# Patient Record
Sex: MALE | Race: WHITE | NOT HISPANIC OR LATINO | Employment: OTHER | URBAN - METROPOLITAN AREA
[De-identification: names, ages, dates, MRNs, and addresses within clinical notes are randomized per-mention and may not be internally consistent; named-entity substitution may affect disease eponyms.]

---

## 2017-11-03 ENCOUNTER — OFFICE VISIT (OUTPATIENT)
Dept: NEUROSURGERY | Facility: CLINIC | Age: 82
End: 2017-11-03

## 2017-11-03 ENCOUNTER — HOSPITAL ENCOUNTER (OUTPATIENT)
Dept: CT IMAGING | Facility: HOSPITAL | Age: 82
Discharge: HOME | End: 2017-11-03
Admitting: PHYSICIAN ASSISTANT

## 2017-11-03 VITALS
HEIGHT: 71 IN | OXYGEN SATURATION: 98 % | BODY MASS INDEX: 24.36 KG/M2 | WEIGHT: 174 LBS | TEMPERATURE: 98.7 F | DIASTOLIC BLOOD PRESSURE: 68 MMHG | SYSTOLIC BLOOD PRESSURE: 154 MMHG

## 2017-11-03 DIAGNOSIS — G45.4 TRANSIENT GLOBAL AMNESIA: ICD-10-CM

## 2017-11-03 DIAGNOSIS — I65.21 STENOSIS OF RIGHT CAROTID ARTERY: ICD-10-CM

## 2017-11-03 DIAGNOSIS — Q21.12 PFO (PATENT FORAMEN OVALE): Primary | ICD-10-CM

## 2017-11-03 DIAGNOSIS — R06.09 DYSPNEA ON EXERTION: ICD-10-CM

## 2017-11-03 PROBLEM — G45.9 TIA (TRANSIENT ISCHEMIC ATTACK): Status: ACTIVE | Noted: 2017-11-03

## 2017-11-03 PROBLEM — I10 HYPERTENSION: Status: ACTIVE | Noted: 2017-11-03

## 2017-11-03 PROBLEM — I51.9 HEART DISEASE: Status: ACTIVE | Noted: 2017-11-03

## 2017-11-03 PROCEDURE — 0 IOPAMIDOL PER 1 ML: Performed by: PHYSICIAN ASSISTANT

## 2017-11-03 PROCEDURE — 82565 ASSAY OF CREATININE: CPT

## 2017-11-03 PROCEDURE — 99024 POSTOP FOLLOW-UP VISIT: CPT | Performed by: PHYSICIAN ASSISTANT

## 2017-11-03 PROCEDURE — 70498 CT ANGIOGRAPHY NECK: CPT

## 2017-11-03 RX ORDER — ALPRAZOLAM 0.25 MG/1
0.25 TABLET ORAL 2 TIMES DAILY PRN
COMMUNITY
End: 2019-09-12

## 2017-11-03 RX ORDER — PRAVASTATIN SODIUM 20 MG
20 TABLET ORAL DAILY
COMMUNITY

## 2017-11-03 RX ORDER — CLOPIDOGREL BISULFATE 75 MG/1
75 TABLET ORAL DAILY
COMMUNITY

## 2017-11-03 RX ORDER — METHOCARBAMOL 500 MG/1
500 TABLET, FILM COATED ORAL 4 TIMES DAILY
COMMUNITY
End: 2018-06-13 | Stop reason: HOSPADM

## 2017-11-03 RX ORDER — TAMSULOSIN HYDROCHLORIDE 0.4 MG/1
1 CAPSULE ORAL NIGHTLY
COMMUNITY
End: 2020-07-16 | Stop reason: ALTCHOICE

## 2017-11-03 RX ORDER — AMLODIPINE BESYLATE 10 MG/1
10 TABLET ORAL DAILY
COMMUNITY
End: 2018-05-07

## 2017-11-03 RX ORDER — NITROGLYCERIN 400 UG/1
1 SPRAY ORAL AS NEEDED
COMMUNITY

## 2017-11-03 RX ORDER — CARVEDILOL 6.25 MG/1
6.25 TABLET ORAL 2 TIMES DAILY WITH MEALS
COMMUNITY

## 2017-11-03 RX ORDER — TRAMADOL HYDROCHLORIDE 50 MG/1
50 TABLET ORAL EVERY 6 HOURS PRN
COMMUNITY
End: 2019-09-12

## 2017-11-03 RX ORDER — ASPIRIN 81 MG/1
81 TABLET ORAL DAILY
COMMUNITY

## 2017-11-03 RX ADMIN — IOPAMIDOL 50 ML: 755 INJECTION, SOLUTION INTRAVENOUS at 17:25

## 2017-11-06 ENCOUNTER — OFFICE VISIT (OUTPATIENT)
Dept: NEUROSURGERY | Facility: CLINIC | Age: 82
End: 2017-11-06

## 2017-11-06 VITALS
TEMPERATURE: 97 F | SYSTOLIC BLOOD PRESSURE: 144 MMHG | DIASTOLIC BLOOD PRESSURE: 58 MMHG | BODY MASS INDEX: 24.22 KG/M2 | HEIGHT: 71 IN | WEIGHT: 173 LBS

## 2017-11-06 DIAGNOSIS — I65.21 STENOSIS OF RIGHT CAROTID ARTERY: Primary | ICD-10-CM

## 2017-11-06 LAB — CREAT BLDA-MCNC: 1.7 MG/DL (ref 0.6–1.3)

## 2017-11-06 PROCEDURE — 99203 OFFICE O/P NEW LOW 30 MIN: CPT | Performed by: NEUROLOGICAL SURGERY

## 2017-11-06 NOTE — PROGRESS NOTES
NAME: HERMAN ZAVALA   DOS: 2017  : 1933  PCP: REY Alvarez    Chief Complaint:   A is an 84-year-old with an asymptomatic right carotid bruit  Chief Complaint   Patient presents with   • Carotid Artery Disease       History of Present Illness:  84 y.o. male      he presents for evaluation with a CTA of the neck and head he denies amaurosis weakness or other issues he is on aspirin Plavix and cholesterol medicines.  symptomatically carotid  PMHX  Allergies:  Allergies   Allergen Reactions   • Penicillins      Medications    Current Outpatient Prescriptions:   •  ALPRAZolam (XANAX) 0.25 MG tablet, Take 0.25 mg by mouth 2 (Two) Times a Day As Needed for Anxiety., Disp: , Rfl:   •  amLODIPine (NORVASC) 10 MG tablet, Take 10 mg by mouth Daily., Disp: , Rfl:   •  aspirin (ECOTRIN LOW STRENGTH) 81 MG EC tablet, Take 81 mg by mouth Daily., Disp: , Rfl:   •  carvedilol (COREG) 6.25 MG tablet, Take 6.25 mg by mouth 2 (Two) Times a Day With Meals., Disp: , Rfl:   •  ClonazePAM (KLONOPIN PO), Take 10 mg by mouth., Disp: , Rfl:   •  clopidogrel (PLAVIX) 75 MG tablet, Take 75 mg by mouth Daily., Disp: , Rfl:   •  COLCHICINE-PROBENECID PO, Take  by mouth., Disp: , Rfl:   •  linaclotide (LINZESS) 145 MCG capsule capsule, Take 145 mcg by mouth Every Morning Before Breakfast., Disp: , Rfl:   •  methocarbamol (ROBAXIN) 500 MG tablet, Take 500 mg by mouth 4 (Four) Times a Day., Disp: , Rfl:   •  nitroglycerin (NITROLINGUAL) 0.4 MG/SPRAY spray, Place 1 spray under the tongue As Needed for Chest Pain., Disp: , Rfl:   •  pravastatin (PRAVACHOL) 20 MG tablet, Take 20 mg by mouth Daily., Disp: , Rfl:   •  tamsulosin (FLOMAX) 0.4 MG capsule 24 hr capsule, Take 1 capsule by mouth Every Night., Disp: , Rfl:   •  traMADol (ULTRAM) 50 MG tablet, Take 50 mg by mouth Every 6 (Six) Hours As Needed for Moderate Pain ., Disp: , Rfl:   Past Medical History:  Past Medical History:   Diagnosis Date   • Heart disease    •  Hypertension    • Myocardial infarction 2003     Past Surgical History:  Past Surgical History:   Procedure Laterality Date   • CARDIAC SURGERY  2003    Open heart surgery, x4 bypasses   • EYE SURGERY  10/22/09 & 09/23/14    x2   • HERNIA REPAIR  1962    x2   • PROSTATE SURGERY  1980     Social Hx:  Social History   Substance Use Topics   • Smoking status: Never Smoker   • Smokeless tobacco: None   • Alcohol use No     Family Hx:  Family History   Problem Relation Age of Onset   • Arthritis Mother    • Arthritis Father      Review of Systems:        Review of Systems   Constitutional: Positive for fatigue. Negative for activity change, appetite change, chills, diaphoresis, fever and unexpected weight change.   HENT: Negative for congestion, dental problem, drooling, ear discharge, ear pain, facial swelling, hearing loss, mouth sores, nosebleeds, postnasal drip, rhinorrhea, sinus pressure, sneezing, sore throat, tinnitus, trouble swallowing and voice change.    Eyes: Negative for photophobia, pain, discharge, redness, itching and visual disturbance.   Respiratory: Negative for apnea, cough, choking, chest tightness, shortness of breath, wheezing and stridor.    Cardiovascular: Negative for chest pain, palpitations and leg swelling.   Gastrointestinal: Negative for abdominal distention, abdominal pain, anal bleeding, blood in stool, constipation, diarrhea, nausea, rectal pain and vomiting.   Endocrine: Negative for cold intolerance, heat intolerance, polydipsia, polyphagia and polyuria.   Genitourinary: Negative for decreased urine volume, difficulty urinating, dysuria, enuresis, flank pain, frequency, genital sores, hematuria and urgency.   Musculoskeletal: Negative for arthralgias, back pain, gait problem, joint swelling, myalgias, neck pain and neck stiffness.   Skin: Negative for color change, pallor, rash and wound.   Allergic/Immunologic: Negative for environmental allergies, food allergies and immunocompromised  state.   Neurological: Negative for dizziness, tremors, seizures, syncope, facial asymmetry, speech difficulty, weakness, light-headedness, numbness and headaches.   Hematological: Negative for adenopathy. Does not bruise/bleed easily.   Psychiatric/Behavioral: Negative for agitation, behavioral problems, confusion, decreased concentration, dysphoric mood, hallucinations, self-injury, sleep disturbance and suicidal ideas. The patient is not nervous/anxious and is not hyperactive.    All other systems reviewed and are negative.  I have reviewed this note template and all pertinent parts of the review of systems social, family history, surgical history and medication list        Physical Examination:  Vitals:    11/06/17 1607   BP: 144/58   Temp:       General Appearance:   Well developed, well nourished, well groomed, alert, and cooperative.  Neurological examination:  Neurologic Exam  Vital signs were reviewed and documented in the chart  Patient appeared in good neurologic function with normal comprehension fluent speech  Mood and affect are normal  Sense of smell deferred    Pupils symmetric equally reactive funduscopic exam not visualized   Visual fields intact to confrontation  Extraocular movements intact  Face motor function is symmetric  Facial sensations normal  Hearing intact to finger rub hearing intact to finger rub  Tongue is midline  Palate symmetric  Swallowing normal  Shoulder shrug normal    Muscle bulk and tone normal  5 out of 5 strength no motor drift  Gait normal intact  Negative Romberg  No clonus long tract signs or myelopathy    Reflexes symmetric  Mild edema noted and extremities skin appears normal    Straight leg raise sign absent  No signs of intrinsic hip dysfunction  Back is without any lesions or abnormality  Feet are warm and well perfused        Review of Imaging/DATA:  Reviewed a CTA of the neck and head demonstrates a high-grade likely stenosis of the right internal carotid with  diffuse calcification redundant carotid is present  Diagnoses/Plan:    Mr. Gallegos is a 84 y.o. male   I had an extensive discussion with him I offered him carotid stenting and/or endarterectomy  and I believe these are equivalent therapy.  He would be a candidate for R carotid intervention trialed and I explained that to them.    That being said I also explained to him that he may be a candidate and explained the logic of the crest to trial.  It is a medical versus surgical treatment trial and explained to him that best medical management would consist of aspirin Plavix and statin therapy.  He is clearly asymptomatic and after extensive discussions he is recommended with his advanced age and the risk of the procedures to monitor this clinically.  I think that's a very good decision and 1 that is very reasonable given the absence of symptomatology.  I urged him to call me and follow-up sooner if there is any issues I will see him back in 6 months with a carotid ultrasound.

## 2018-05-07 ENCOUNTER — OFFICE VISIT (OUTPATIENT)
Dept: NEUROSURGERY | Facility: CLINIC | Age: 83
End: 2018-05-07

## 2018-05-07 ENCOUNTER — HOSPITAL ENCOUNTER (OUTPATIENT)
Dept: CARDIOLOGY | Facility: HOSPITAL | Age: 83
Discharge: HOME | End: 2018-05-07
Attending: NEUROLOGICAL SURGERY | Admitting: NEUROLOGICAL SURGERY

## 2018-05-07 VITALS
BODY MASS INDEX: 23.1 KG/M2 | RESPIRATION RATE: 18 BRPM | DIASTOLIC BLOOD PRESSURE: 60 MMHG | OXYGEN SATURATION: 99 % | HEIGHT: 71 IN | SYSTOLIC BLOOD PRESSURE: 118 MMHG | WEIGHT: 165 LBS

## 2018-05-07 DIAGNOSIS — I65.21 STENOSIS OF RIGHT CAROTID ARTERY: ICD-10-CM

## 2018-05-07 DIAGNOSIS — R09.89 RIGHT CAROTID BRUIT: Primary | ICD-10-CM

## 2018-05-07 LAB
BH CV ECHO MEAS - BSA(HAYCOCK): 2 M^2
BH CV ECHO MEAS - BSA: 2 M^2
BH CV ECHO MEAS - BZI_BMI: 24.1 KILOGRAMS/M^2
BH CV ECHO MEAS - BZI_METRIC_HEIGHT: 180.3 CM
BH CV ECHO MEAS - BZI_METRIC_WEIGHT: 78.5 KG
BH CV XLRA MEAS LEFT CCA RATIO VEL: 104 CM/SEC
BH CV XLRA MEAS LEFT DIST CCA EDV: 16.5 CM/SEC
BH CV XLRA MEAS LEFT DIST CCA PSV: 91.1 CM/SEC
BH CV XLRA MEAS LEFT DIST ICA EDV: 19.6 CM/SEC
BH CV XLRA MEAS LEFT DIST ICA PSV: 88 CM/SEC
BH CV XLRA MEAS LEFT ICA RATIO VEL: 101 CM/SEC
BH CV XLRA MEAS LEFT ICA/CCA RATIO: 0.97
BH CV XLRA MEAS LEFT MID CCA EDV: 12.6 CM/SEC
BH CV XLRA MEAS LEFT MID CCA PSV: 104.5 CM/SEC
BH CV XLRA MEAS LEFT MID ICA EDV: 16.5 CM/SEC
BH CV XLRA MEAS LEFT MID ICA PSV: 72.3 CM/SEC
BH CV XLRA MEAS LEFT PROX CCA EDV: 10.2 CM/SEC
BH CV XLRA MEAS LEFT PROX CCA PSV: 135.1 CM/SEC
BH CV XLRA MEAS LEFT PROX ECA PSV: 92.7 CM/SEC
BH CV XLRA MEAS LEFT PROX ICA EDV: 18.9 CM/SEC
BH CV XLRA MEAS LEFT PROX ICA PSV: 102.1 CM/SEC
BH CV XLRA MEAS LEFT PROX SCLA EDV: 113 CM/SEC
BH CV XLRA MEAS LEFT PROX SCLA PSV: 129.5 CM/SEC
BH CV XLRA MEAS LEFT VERTEBRAL A PSV: 46.8 CM/SEC
BH CV XLRA MEAS RIGHT CCA RATIO VEL: 68.4 CM/SEC
BH CV XLRA MEAS RIGHT DIST CCA EDV: 9.4 CM/SEC
BH CV XLRA MEAS RIGHT DIST CCA PSV: 57.9 CM/SEC
BH CV XLRA MEAS RIGHT DIST ICA EDV: 11.4 CM/SEC
BH CV XLRA MEAS RIGHT DIST ICA PSV: 48 CM/SEC
BH CV XLRA MEAS RIGHT ICA RATIO VEL: 336 CM/SEC
BH CV XLRA MEAS RIGHT ICA/CCA RATIO: 4.9
BH CV XLRA MEAS RIGHT MID CCA EDV: 9.9 CM/SEC
BH CV XLRA MEAS RIGHT MID CCA PSV: 68.9 CM/SEC
BH CV XLRA MEAS RIGHT MID ICA EDV: 48 CM/SEC
BH CV XLRA MEAS RIGHT MID ICA PSV: 336 CM/SEC
BH CV XLRA MEAS RIGHT PROX CCA EDV: 12.1 CM/SEC
BH CV XLRA MEAS RIGHT PROX CCA PSV: 63.4 CM/SEC
BH CV XLRA MEAS RIGHT PROX ECA PSV: 127.1 CM/SEC
BH CV XLRA MEAS RIGHT PROX ICA EDV: 11.9 CM/SEC
BH CV XLRA MEAS RIGHT PROX ICA PSV: 69.8 CM/SEC
BH CV XLRA MEAS RIGHT PROX SCLA PSV: 62.9 CM/SEC
BH CV XLRA MEAS RIGHT VERTEBRAL A PSV: 54.6 CM/SEC
LEFT ARM BP: NORMAL MMHG
RIGHT ARM BP: NORMAL MMHG

## 2018-05-07 PROCEDURE — 93880 EXTRACRANIAL BILAT STUDY: CPT

## 2018-05-07 PROCEDURE — 93880 EXTRACRANIAL BILAT STUDY: CPT | Performed by: INTERNAL MEDICINE

## 2018-05-07 PROCEDURE — 99213 OFFICE O/P EST LOW 20 MIN: CPT | Performed by: NEUROLOGICAL SURGERY

## 2018-05-07 RX ORDER — SODIUM CHLORIDE 0.65 %
AEROSOL, SPRAY (ML) NASAL
Refills: 0 | COMMUNITY
Start: 2018-04-11

## 2018-05-07 RX ORDER — NITROGLYCERIN 0.4 MG/1
TABLET SUBLINGUAL
Refills: 0 | COMMUNITY
Start: 2018-04-04

## 2018-05-07 RX ORDER — CYCLOBENZAPRINE HCL 5 MG
TABLET ORAL
Refills: 0 | COMMUNITY
Start: 2018-04-16 | End: 2019-09-12

## 2018-05-07 RX ORDER — CIPROFLOXACIN 500 MG/1
TABLET, FILM COATED ORAL
COMMUNITY
Start: 2018-04-11 | End: 2018-06-13 | Stop reason: HOSPADM

## 2018-05-07 RX ORDER — PREDNISONE 20 MG/1
20 TABLET ORAL 2 TIMES DAILY
Refills: 0 | COMMUNITY
Start: 2018-03-02 | End: 2018-06-13 | Stop reason: HOSPADM

## 2018-05-07 RX ORDER — MECLIZINE HYDROCHLORIDE 25 MG/1
TABLET ORAL
Refills: 0 | COMMUNITY
Start: 2018-03-02

## 2018-05-07 RX ORDER — GABAPENTIN 100 MG/1
100 CAPSULE ORAL
Refills: 0 | COMMUNITY
Start: 2018-03-26 | End: 2018-06-13 | Stop reason: HOSPADM

## 2018-05-07 RX ORDER — PROBENECID AND COLCHICINE 500; .5 MG/1; MG/1
TABLET ORAL
Refills: 0 | COMMUNITY
Start: 2018-02-23 | End: 2018-06-13 | Stop reason: HOSPADM

## 2018-05-07 RX ORDER — AMLODIPINE BESYLATE 5 MG/1
2.5 TABLET ORAL DAILY
Refills: 0 | COMMUNITY
Start: 2018-05-02 | End: 2019-09-12

## 2018-05-07 RX ORDER — MECLIZINE HCL 12.5 MG/1
12.5 TABLET ORAL EVERY 8 HOURS PRN
Refills: 0 | COMMUNITY
Start: 2018-03-01 | End: 2018-05-07

## 2018-05-07 NOTE — PROGRESS NOTES
NAME: HERMAN ZAVALA   DOS: 2018  : 1933  PCP: Jean Paul Waldron MD    Chief Complaint:  Right-sided carotid stenosis, asymptomatic  Chief Complaint   Patient presents with   • R-carotid Stenosis     6 month duplex       History of Present Illness:  84 y.o. male   This 84-year-old gentleman who has a diagnosis of a symptomatic high-grade right-sided carotid stenosis at which time I talked him last visit about endarterectomy versus stenting versus management in the crest trial.  He is elected to defer treatment.  He denies symptoms of amaurosis in the right eye he's currently on aspirin Plavix and Pravachol and is here for follow-up doing well    PMHX  Allergies:  Allergies   Allergen Reactions   • Penicillins      Medications    Current Outpatient Prescriptions:   •  ALPRAZolam (XANAX) 0.25 MG tablet, Take 0.25 mg by mouth 2 (Two) Times a Day As Needed for Anxiety., Disp: , Rfl:   •  amLODIPine (NORVASC) 5 MG tablet, Take 2.5 mg by mouth Daily., Disp: , Rfl: 0  •  aspirin (ECOTRIN LOW STRENGTH) 81 MG EC tablet, Take 81 mg by mouth Daily., Disp: , Rfl:   •  carvedilol (COREG) 6.25 MG tablet, Take 6.25 mg by mouth 2 (Two) Times a Day With Meals., Disp: , Rfl:   •  ciprofloxacin (CIPRO) 500 MG tablet, , Disp: , Rfl:   •  ClonazePAM (KLONOPIN PO), Take 10 mg by mouth., Disp: , Rfl:   •  clopidogrel (PLAVIX) 75 MG tablet, Take 75 mg by mouth Daily., Disp: , Rfl:   •  colchicine-probenecid (COL-BENEMID) 0.5-500 MG tablet, , Disp: , Rfl: 0  •  COLCHICINE-PROBENECID PO, Take  by mouth., Disp: , Rfl:   •  cyclobenzaprine (FLEXERIL) 5 MG tablet, take 1/2-1 tablet by mouth at bedtime if needed for NECK MUSCLE PAIN, Disp: , Rfl: 0  •  gabapentin (NEURONTIN) 100 MG capsule, Take 100 mg by mouth every night at bedtime., Disp: , Rfl: 0  •  hydrocortisone 2.5 % cream, APPLY AS DIRECTED UP TO 3 TIMES A DAY, Disp: , Rfl: 0  •  linaclotide (LINZESS) 145 MCG capsule capsule, Take 145 mcg by mouth Every Morning  Before Breakfast., Disp: , Rfl:   •  meclizine (ANTIVERT) 25 MG tablet, take 1 tablet by mouth every 6 hours if needed for VERTIGO, Disp: , Rfl: 0  •  methocarbamol (ROBAXIN) 500 MG tablet, Take 500 mg by mouth 4 (Four) Times a Day., Disp: , Rfl:   •  nitroglycerin (NITROLINGUAL) 0.4 MG/SPRAY spray, Place 1 spray under the tongue As Needed for Chest Pain., Disp: , Rfl:   •  nitroglycerin (NITROSTAT) 0.4 MG SL tablet, PLACE 1 TABLET UNDER THE TONGUE AND ALLOW TO DISSOLVE 1 TIME, Disp: , Rfl: 0  •  pravastatin (PRAVACHOL) 20 MG tablet, Take 20 mg by mouth Daily., Disp: , Rfl:   •  predniSONE (DELTASONE) 20 MG tablet, Take 20 mg by mouth 2 (Two) Times a Day., Disp: , Rfl: 0  •  RA SALINE NASAL SPRAY 0.65 % nasal spray, USE 2 SPRAYS IN EACH NOSTRIL EVERY 4 HOURS IF NEEDED, Disp: , Rfl: 0  •  tamsulosin (FLOMAX) 0.4 MG capsule 24 hr capsule, Take 1 capsule by mouth Every Night., Disp: , Rfl:   •  traMADol (ULTRAM) 50 MG tablet, Take 50 mg by mouth Every 6 (Six) Hours As Needed for Moderate Pain ., Disp: , Rfl:   Past Medical History:  Past Medical History:   Diagnosis Date   • Heart disease    • Hypertension    • Myocardial infarction 2003     Past Surgical History:  Past Surgical History:   Procedure Laterality Date   • CARDIAC SURGERY  2003    Open heart surgery, x4 bypasses   • EYE SURGERY  10/22/09 & 09/23/14    x2   • HERNIA REPAIR  1962    x2   • PROSTATE SURGERY  1980     Social Hx:  Social History   Substance Use Topics   • Smoking status: Never Smoker   • Smokeless tobacco: Not on file   • Alcohol use No     Family Hx:  Family History   Problem Relation Age of Onset   • Arthritis Mother    • Arthritis Father      Review of Systems:        Review of Systems   Constitutional: Negative for activity change, appetite change, chills, diaphoresis, fatigue, fever and unexpected weight change.   HENT: Negative for congestion, dental problem, drooling, ear discharge, ear pain, facial swelling, hearing loss, mouth sores,  nosebleeds, postnasal drip, rhinorrhea, sinus pressure, sneezing, sore throat, tinnitus, trouble swallowing and voice change.    Eyes: Negative for photophobia, pain, discharge, redness, itching and visual disturbance.   Respiratory: Negative for apnea, cough, choking, chest tightness, shortness of breath, wheezing and stridor.    Cardiovascular: Negative for chest pain, palpitations and leg swelling.   Gastrointestinal: Negative for abdominal distention, abdominal pain, anal bleeding, blood in stool, constipation, diarrhea, nausea, rectal pain and vomiting.   Endocrine: Negative for cold intolerance, heat intolerance, polydipsia, polyphagia and polyuria.   Genitourinary: Negative for decreased urine volume, difficulty urinating, dysuria, enuresis, flank pain, frequency, genital sores, hematuria and urgency.   Musculoskeletal: Negative for arthralgias, back pain, gait problem, joint swelling, myalgias, neck pain and neck stiffness.   Skin: Negative for color change, pallor, rash and wound.   Allergic/Immunologic: Negative for environmental allergies, food allergies and immunocompromised state.   Neurological: Negative for dizziness, tremors, seizures, syncope, facial asymmetry, speech difficulty, weakness, light-headedness, numbness and headaches.   Hematological: Negative for adenopathy. Does not bruise/bleed easily.   Psychiatric/Behavioral: Negative for agitation, behavioral problems, confusion, decreased concentration, dysphoric mood, hallucinations, self-injury, sleep disturbance and suicidal ideas. The patient is not nervous/anxious and is not hyperactive.    All other systems reviewed and are negative.     I have reviewed this note template and all pertinent parts of the review of systems social, family history, surgical history and medication list      Physical Examination:  Vitals:    05/07/18 1138   BP: 118/60   Resp: 18   SpO2: 99%      General Appearance:   Well developed, well nourished, well groomed,  alert, and cooperative.  Neurological examination:  Neurologic Exam  He is awake alert and follows commands    He has no motor drift his gait is normal he has no evidence of carotid bruits I can ascertain    Review of Imaging/DATA:  I reviewed a carotid ultrasound demonstrates systolic velocities of 330s over 40 a good waveform with ratios of over 4  Diagnoses/Plan:    Mr. Gallegos is a 84 y.o. male   He has asymptomatic high-grade carotid stenosis offered him a right-sided endarterectomy as we did before however the benefit in this age population of surgery for asymptomatic disease is marginal and I explained that.  I be happy to enroll him in the crest trial and/or did offer him surgery but both of us feel ongoing management with monitoring would be in order.  We'll see him back in 6-8 months

## 2018-06-13 ENCOUNTER — LAB (OUTPATIENT)
Dept: LAB | Facility: HOSPITAL | Age: 83
End: 2018-06-13

## 2018-06-13 ENCOUNTER — OFFICE VISIT (OUTPATIENT)
Dept: GASTROENTEROLOGY | Facility: CLINIC | Age: 83
End: 2018-06-13

## 2018-06-13 VITALS
SYSTOLIC BLOOD PRESSURE: 150 MMHG | DIASTOLIC BLOOD PRESSURE: 78 MMHG | TEMPERATURE: 96.7 F | WEIGHT: 168.2 LBS | HEIGHT: 67 IN | BODY MASS INDEX: 26.4 KG/M2 | HEART RATE: 57 BPM | OXYGEN SATURATION: 98 %

## 2018-06-13 DIAGNOSIS — K59.00 CONSTIPATION, UNSPECIFIED CONSTIPATION TYPE: ICD-10-CM

## 2018-06-13 DIAGNOSIS — R10.30 LOWER ABDOMINAL PAIN: ICD-10-CM

## 2018-06-13 DIAGNOSIS — R14.0 BLOATING: ICD-10-CM

## 2018-06-13 DIAGNOSIS — K59.00 CONSTIPATION, UNSPECIFIED CONSTIPATION TYPE: Primary | ICD-10-CM

## 2018-06-13 LAB
ALBUMIN SERPL-MCNC: 4.31 G/DL (ref 3.2–4.8)
ALBUMIN/GLOB SERPL: 1.8 G/DL (ref 1.5–2.5)
ALP SERPL-CCNC: 68 U/L (ref 25–100)
ALT SERPL W P-5'-P-CCNC: 24 U/L (ref 7–40)
ANION GAP SERPL CALCULATED.3IONS-SCNC: 7 MMOL/L (ref 3–11)
AST SERPL-CCNC: 21 U/L (ref 0–33)
BASOPHILS # BLD AUTO: 0.02 10*3/MM3 (ref 0–0.2)
BASOPHILS NFR BLD AUTO: 0.2 % (ref 0–1)
BILIRUB SERPL-MCNC: 0.6 MG/DL (ref 0.3–1.2)
BUN BLD-MCNC: 30 MG/DL (ref 9–23)
BUN/CREAT SERPL: 20.3 (ref 7–25)
CALCIUM SPEC-SCNC: 9.1 MG/DL (ref 8.7–10.4)
CHLORIDE SERPL-SCNC: 104 MMOL/L (ref 99–109)
CO2 SERPL-SCNC: 29 MMOL/L (ref 20–31)
CREAT BLD-MCNC: 1.48 MG/DL (ref 0.6–1.3)
DEPRECATED FTI SERPL-MCNC: 1.9 TBI
DEPRECATED RDW RBC AUTO: 47.5 FL (ref 37–54)
EOSINOPHIL # BLD AUTO: 0.4 10*3/MM3 (ref 0–0.3)
EOSINOPHIL NFR BLD AUTO: 4.6 % (ref 0–3)
ERYTHROCYTE [DISTWIDTH] IN BLOOD BY AUTOMATED COUNT: 13.7 % (ref 11.3–14.5)
GFR SERPL CREATININE-BSD FRML MDRD: 45 ML/MIN/1.73
GLOBULIN UR ELPH-MCNC: 2.4 GM/DL
GLUCOSE BLD-MCNC: 94 MG/DL (ref 70–100)
HCT VFR BLD AUTO: 39.6 % (ref 38.9–50.9)
HGB BLD-MCNC: 13.1 G/DL (ref 13.1–17.5)
IMM GRANULOCYTES # BLD: 0.02 10*3/MM3 (ref 0–0.03)
IMM GRANULOCYTES NFR BLD: 0.2 % (ref 0–0.6)
LYMPHOCYTES # BLD AUTO: 2.22 10*3/MM3 (ref 0.6–4.8)
LYMPHOCYTES NFR BLD AUTO: 25.8 % (ref 24–44)
MCH RBC QN AUTO: 31.4 PG (ref 27–31)
MCHC RBC AUTO-ENTMCNC: 33.1 G/DL (ref 32–36)
MCV RBC AUTO: 95 FL (ref 80–99)
MONOCYTES # BLD AUTO: 0.69 10*3/MM3 (ref 0–1)
MONOCYTES NFR BLD AUTO: 8 % (ref 0–12)
NEUTROPHILS # BLD AUTO: 5.28 10*3/MM3 (ref 1.5–8.3)
NEUTROPHILS NFR BLD AUTO: 61.4 % (ref 41–71)
PLATELET # BLD AUTO: 171 10*3/MM3 (ref 150–450)
PMV BLD AUTO: 11.5 FL (ref 6–12)
POTASSIUM BLD-SCNC: 4.9 MMOL/L (ref 3.5–5.5)
PROT SERPL-MCNC: 6.7 G/DL (ref 5.7–8.2)
RBC # BLD AUTO: 4.17 10*6/MM3 (ref 4.2–5.76)
SODIUM BLD-SCNC: 140 MMOL/L (ref 132–146)
T3RU NFR SERPL: 30.9 % (ref 23–37)
T4 SERPL-MCNC: 6.1 MCG/DL (ref 4.7–11.4)
TSH SERPL DL<=0.05 MIU/L-ACNC: 3.12 MIU/ML (ref 0.35–5.35)
WBC NRBC COR # BLD: 8.61 10*3/MM3 (ref 3.5–10.8)

## 2018-06-13 PROCEDURE — 36415 COLL VENOUS BLD VENIPUNCTURE: CPT | Performed by: INTERNAL MEDICINE

## 2018-06-13 PROCEDURE — 84479 ASSAY OF THYROID (T3 OR T4): CPT

## 2018-06-13 PROCEDURE — 99204 OFFICE O/P NEW MOD 45 MIN: CPT | Performed by: INTERNAL MEDICINE

## 2018-06-13 PROCEDURE — 84436 ASSAY OF TOTAL THYROXINE: CPT

## 2018-06-13 PROCEDURE — 80053 COMPREHEN METABOLIC PANEL: CPT | Performed by: INTERNAL MEDICINE

## 2018-06-13 PROCEDURE — 84443 ASSAY THYROID STIM HORMONE: CPT

## 2018-06-13 PROCEDURE — 85025 COMPLETE CBC W/AUTO DIFF WBC: CPT

## 2018-06-13 RX ORDER — ALLOPURINOL 100 MG/1
100 TABLET ORAL 2 TIMES DAILY
Refills: 0 | COMMUNITY
Start: 2018-05-16

## 2018-06-13 RX ORDER — HYDROXYZINE HYDROCHLORIDE 10 MG/1
25 TABLET, FILM COATED ORAL 3 TIMES DAILY PRN
COMMUNITY

## 2018-06-13 RX ORDER — POTASSIUM CHLORIDE 750 MG/1
10 CAPSULE, EXTENDED RELEASE ORAL DAILY
Refills: 0 | COMMUNITY
Start: 2018-05-17

## 2018-06-13 RX ORDER — POLYETHYLENE GLYCOL 3350 17 G/17G
17 POWDER, FOR SOLUTION ORAL DAILY
COMMUNITY

## 2018-06-13 NOTE — PROGRESS NOTES
PCP: Jean Paul Waldron MD    Chief Complaint   Patient presents with   • Constipation       History of Present Illness:   HPI  Mr. Gallegos is a 85-year-old with a history of coronary disease, hypertension and peripheral vascular disease.  Patient has right carotid artery stenosis greater than 70% and is being followed by neurosurgery.  The patient presents with a greater than five-year history of constipation.  The patient states he needs to take MiraLAX on a daily basis and occasional Linzess to help with regular bowel function.  The patient states that his stool is usually hard and lumpy.  He has not been aware of any ezekiel blood in the stool.  The patient does not take the Linzess daily because he will have a loose stool for the next few days.  Mr. Gallegos has some lower abdominal discomfort and a sense of bloating.  He denies any ezekile abdominal pain after meals.  There is occasional sense of incomplete evacuation.  The patient had a colonoscopy and this was over 10 years ago.  The patient states the procedure was performed by Dr. Villa in [place], [place].  No family history of gastrointestinal cancer in first-degree relatives.  He denies taking any pain medication.  There is no history of any newly prescribed medications.  Mr. Gallegos is unaware of any history of thyroid disease.  There is no history of night sweats, fever or chills.  The patient denies any unexplained weight loss.  Past Medical History:   Diagnosis Date   • Heart disease    • Hypertension    • Myocardial infarction 2003       Past Surgical History:   Procedure Laterality Date   • CARDIAC SURGERY  2003    Open heart surgery, x4 bypasses   • EYE SURGERY  10/22/09 & 09/23/14    x2   • HERNIA REPAIR  1962    x2   • PROSTATE SURGERY  1980         Current Outpatient Prescriptions:   •  allopurinol (ZYLOPRIM) 100 MG tablet, Take 100 mg by mouth 2 (Two) Times a Day., Disp: , Rfl: 0  •  ALPRAZolam (XANAX) 0.25 MG tablet, Take 0.25 mg by  mouth 2 (Two) Times a Day As Needed for Anxiety., Disp: , Rfl:   •  amLODIPine (NORVASC) 5 MG tablet, Take 2.5 mg by mouth Daily., Disp: , Rfl: 0  •  aspirin (ECOTRIN LOW STRENGTH) 81 MG EC tablet, Take 81 mg by mouth Daily., Disp: , Rfl:   •  carvedilol (COREG) 6.25 MG tablet, Take 6.25 mg by mouth 2 (Two) Times a Day With Meals., Disp: , Rfl:   •  clopidogrel (PLAVIX) 75 MG tablet, Take 75 mg by mouth Daily., Disp: , Rfl:   •  cyclobenzaprine (FLEXERIL) 5 MG tablet, take 1/2-1 tablet by mouth at bedtime if needed for NECK MUSCLE PAIN, Disp: , Rfl: 0  •  hydrocortisone 2.5 % cream, APPLY AS DIRECTED UP TO 3 TIMES A DAY, Disp: , Rfl: 0  •  hydrOXYzine (ATARAX) 25 MG tablet, Take 25 mg by mouth 3 (Three) Times a Day As Needed for Itching., Disp: , Rfl:   •  linaclotide (LINZESS) 145 MCG capsule capsule, Take 145 mcg by mouth Every Morning Before Breakfast., Disp: , Rfl:   •  meclizine (ANTIVERT) 25 MG tablet, take 1 tablet by mouth every 6 hours if needed for VERTIGO, Disp: , Rfl: 0  •  nitroglycerin (NITROLINGUAL) 0.4 MG/SPRAY spray, Place 1 spray under the tongue As Needed for Chest Pain., Disp: , Rfl:   •  nitroglycerin (NITROSTAT) 0.4 MG SL tablet, PLACE 1 TABLET UNDER THE TONGUE AND ALLOW TO DISSOLVE 1 TIME, Disp: , Rfl: 0  •  polyethylene glycol (MIRALAX) packet, Take 17 g by mouth Daily., Disp: , Rfl:   •  potassium chloride (MICRO-K) 10 MEQ CR capsule, Take 10 mEq by mouth Daily., Disp: , Rfl: 0  •  pravastatin (PRAVACHOL) 20 MG tablet, Take 20 mg by mouth Daily., Disp: , Rfl:   •  RA SALINE NASAL SPRAY 0.65 % nasal spray, USE 2 SPRAYS IN EACH NOSTRIL EVERY 4 HOURS IF NEEDED, Disp: , Rfl: 0  •  tamsulosin (FLOMAX) 0.4 MG capsule 24 hr capsule, Take 1 capsule by mouth Every Night., Disp: , Rfl:   •  traMADol (ULTRAM) 50 MG tablet, Take 50 mg by mouth Every 6 (Six) Hours As Needed for Moderate Pain ., Disp: , Rfl:     Allergies   Allergen Reactions   • Penicillins        Family History   Problem Relation Age of  Onset   • Arthritis Mother    • Arthritis Father        Social History     Social History   • Marital status:      Spouse name: N/A   • Number of children: N/A   • Years of education: N/A     Occupational History   • Not on file.     Social History Main Topics   • Smoking status: Never Smoker   • Smokeless tobacco: Not on file   • Alcohol use No   • Drug use: No   • Sexual activity: Defer     Other Topics Concern   • Not on file     Social History Narrative   • No narrative on file       Review of Systems   Constitutional: Positive for appetite change. Negative for activity change, fatigue, fever and unexpected weight change (loss).   HENT: Positive for hearing loss. Negative for dental problem, mouth sores, postnasal drip, sneezing, trouble swallowing and voice change.    Eyes: Negative for pain, redness, itching and visual disturbance.   Respiratory: Positive for shortness of breath. Negative for cough, choking, chest tightness and wheezing.    Cardiovascular: Negative for chest pain, palpitations and leg swelling.   Gastrointestinal: Positive for abdominal distention (bloating) and constipation. Negative for abdominal pain, anal bleeding, blood in stool, diarrhea, nausea, rectal pain and vomiting.        Soreness on right side of abdomen   Endocrine: Negative for cold intolerance, heat intolerance, polydipsia, polyphagia and polyuria.   Genitourinary: Negative.  Negative for dysuria, enuresis, flank pain, hematuria and urgency.   Musculoskeletal: Positive for gait problem. Negative for arthralgias, back pain, joint swelling and myalgias.   Skin: Negative for color change, pallor and rash.   Allergic/Immunologic: Negative for environmental allergies, food allergies and immunocompromised state.   Neurological: Positive for dizziness. Negative for tremors, seizures, facial asymmetry, speech difficulty, numbness and headaches.   Hematological: Negative for adenopathy.   Psychiatric/Behavioral: Negative for  behavioral problems, confusion, dysphoric mood, hallucinations and self-injury.       Vitals:    06/13/18 1253   BP: 150/78   Pulse: 57   Temp: 96.7 °F (35.9 °C)   SpO2: 98%       Physical Exam   Constitutional: He is oriented to person, place, and time. He appears well-nourished. No distress.   HENT:   Head: Normocephalic and atraumatic.   Mouth/Throat: Oropharynx is clear and moist. No oropharyngeal exudate.   Eyes: EOM are normal. No scleral icterus.   Neck: Neck supple. No thyromegaly present.   Cardiovascular: Normal rate, regular rhythm and normal heart sounds.  Exam reveals no gallop.    No murmur heard.  Pulmonary/Chest: Effort normal and breath sounds normal. He has no wheezes. He has no rales.   Abdominal: Soft. Bowel sounds are normal. Tenderness: suprapubic. There is no rebound and no guarding.   Musculoskeletal: Normal range of motion. He exhibits no edema.   Lymphadenopathy:     He has no cervical adenopathy.   Neurological: He is alert and oriented to person, place, and time. He exhibits normal muscle tone.   Skin: Skin is dry. No erythema.   Psychiatric: He has a normal mood and affect. His behavior is normal. Thought content normal.   Vitals reviewed.      Dwain was seen today for constipation.    Diagnoses and all orders for this visit:    Constipation, unspecified constipation type  -     CBC & Differential; Future  -     Comprehensive Metabolic Panel  -     Thyroid Panel With TSH; Future  -     CT Abdomen Pelvis With Contrast; Future    Bloating  -     CBC & Differential; Future  -     Comprehensive Metabolic Panel  -     Thyroid Panel With TSH; Future  -     CT Abdomen Pelvis With Contrast; Future    Lower abdominal pain  -     CBC & Differential; Future  -     Comprehensive Metabolic Panel  -     Thyroid Panel With TSH; Future  -     CT Abdomen Pelvis With Contrast; Future    The differential diagnosis includes a luminal colorectal abnormality given the history.  The patient last had a  colonoscopy over 10 years ago.  The patient does have significant peripheral vascular disease and is on Plavix daily.  Also cannot exclude an electrolyte abnormality, thyroid disease and medication affect.      Plan: Will proceed with CBC, CMP and thyroid panel.           Will schedule the patient for a CT scan of the abdomen and pelvis with oral contrast.           Discussed proceeding with a colonoscopy but he would not likely be able to stop the Plavix given the degree of peripheral vascular disease.  Discussed the case with the patient and his family.    I spent over 50% of the office visit counseling and answering questions with the patient.

## 2018-06-15 ENCOUNTER — TELEPHONE (OUTPATIENT)
Dept: GASTROENTEROLOGY | Facility: CLINIC | Age: 83
End: 2018-06-15

## 2018-06-18 ENCOUNTER — TELEPHONE (OUTPATIENT)
Dept: GASTROENTEROLOGY | Facility: CLINIC | Age: 83
End: 2018-06-18

## 2018-06-20 ENCOUNTER — HOSPITAL ENCOUNTER (OUTPATIENT)
Dept: CT IMAGING | Facility: HOSPITAL | Age: 83
Discharge: HOME | End: 2018-06-20
Attending: INTERNAL MEDICINE | Admitting: INTERNAL MEDICINE

## 2018-06-20 DIAGNOSIS — R10.30 LOWER ABDOMINAL PAIN: ICD-10-CM

## 2018-06-20 DIAGNOSIS — R14.0 BLOATING: ICD-10-CM

## 2018-06-20 DIAGNOSIS — K59.00 CONSTIPATION, UNSPECIFIED CONSTIPATION TYPE: ICD-10-CM

## 2018-06-20 PROCEDURE — 63710000001 BARIUM 2 % SUSPENSION: Performed by: INTERNAL MEDICINE

## 2018-06-20 PROCEDURE — 74176 CT ABD & PELVIS W/O CONTRAST: CPT

## 2018-06-20 PROCEDURE — A9270 NON-COVERED ITEM OR SERVICE: HCPCS | Performed by: INTERNAL MEDICINE

## 2018-06-20 RX ADMIN — BARIUM SULFATE 450 ML: 21 SUSPENSION ORAL at 15:00

## 2018-06-22 ENCOUNTER — TELEPHONE (OUTPATIENT)
Dept: GASTROENTEROLOGY | Facility: CLINIC | Age: 83
End: 2018-06-22

## 2018-06-22 NOTE — TELEPHONE ENCOUNTER
----- Message from Manish Lujan MD sent at 6/22/2018 12:43 PM EDT -----  Let Mr. Gallegos know that his CT scan was normal with regard to the colon.  Thanks,  LUCINA

## 2018-06-26 ENCOUNTER — TELEPHONE (OUTPATIENT)
Dept: GASTROENTEROLOGY | Facility: CLINIC | Age: 83
End: 2018-06-26

## 2019-04-04 ENCOUNTER — TELEPHONE (OUTPATIENT)
Dept: NEUROSURGERY | Facility: CLINIC | Age: 84
End: 2019-04-04

## 2019-04-04 NOTE — TELEPHONE ENCOUNTER
"Provider:  Usama  Caller:   Time of call:     Phone #:    Surgery:  NA  Surgery Date:  NA  Last visit:   5/7/2018  Next visit: DEANGELO MARTI:         Reason for call:         Please see Suki's message below.       ----- Message from Suki Maria sent at 4/4/2019 10:54 AM EDT -----  Regarding: Carotid Duplex (Usama)  BORISI - Called patient to schedule 6-8 month followup appt & carotid duplex. He states \"he does not want to come back at this time. He will call if he decides to return.\"  Should clinical staff call patient to discuss?    "

## 2019-05-02 ENCOUNTER — APPOINTMENT (OUTPATIENT)
Dept: CARDIOLOGY | Facility: HOSPITAL | Age: 84
End: 2019-05-02

## 2019-08-27 ENCOUNTER — TELEPHONE (OUTPATIENT)
Dept: NEUROSURGERY | Facility: CLINIC | Age: 84
End: 2019-08-27

## 2019-08-27 DIAGNOSIS — I65.21 ASYMPTOMATIC STENOSIS OF RIGHT CAROTID ARTERY: Primary | ICD-10-CM

## 2019-08-27 NOTE — TELEPHONE ENCOUNTER
Calling to R/S the appointment that was canceled in May. Can you pend a new Carotid Duplex order?    Thanks

## 2019-09-12 ENCOUNTER — HOSPITAL ENCOUNTER (OUTPATIENT)
Dept: CARDIOLOGY | Facility: HOSPITAL | Age: 84
Discharge: HOME | End: 2019-09-12
Admitting: NEUROLOGICAL SURGERY

## 2019-09-12 ENCOUNTER — OFFICE VISIT (OUTPATIENT)
Dept: NEUROSURGERY | Facility: CLINIC | Age: 84
End: 2019-09-12

## 2019-09-12 VITALS
HEIGHT: 67 IN | WEIGHT: 164.8 LBS | DIASTOLIC BLOOD PRESSURE: 78 MMHG | RESPIRATION RATE: 16 BRPM | BODY MASS INDEX: 25.87 KG/M2 | SYSTOLIC BLOOD PRESSURE: 186 MMHG

## 2019-09-12 VITALS — BODY MASS INDEX: 26.4 KG/M2 | WEIGHT: 168.21 LBS | HEIGHT: 67 IN

## 2019-09-12 DIAGNOSIS — I65.21 ASYMPTOMATIC STENOSIS OF RIGHT CAROTID ARTERY: ICD-10-CM

## 2019-09-12 DIAGNOSIS — I65.21 STENOSIS OF RIGHT CAROTID ARTERY: Primary | ICD-10-CM

## 2019-09-12 LAB
BH CV XLRA MEAS LEFT CCA RATIO VEL: 97 CM/SEC
BH CV XLRA MEAS LEFT DIST CCA EDV: 16 CM/SEC
BH CV XLRA MEAS LEFT DIST CCA PSV: 81.6 CM/SEC
BH CV XLRA MEAS LEFT DIST ICA EDV: 23.2 CM/SEC
BH CV XLRA MEAS LEFT DIST ICA PSV: 96.5 CM/SEC
BH CV XLRA MEAS LEFT ICA RATIO VEL: 87.7 CM/SEC
BH CV XLRA MEAS LEFT ICA/CCA RATIO: 0.9
BH CV XLRA MEAS LEFT MID CCA EDV: 13.8 CM/SEC
BH CV XLRA MEAS LEFT MID CCA PSV: 97.6 CM/SEC
BH CV XLRA MEAS LEFT MID ICA EDV: 21 CM/SEC
BH CV XLRA MEAS LEFT MID ICA PSV: 80.5 CM/SEC
BH CV XLRA MEAS LEFT PROX CCA EDV: 15.7 CM/SEC
BH CV XLRA MEAS LEFT PROX CCA PSV: 164 CM/SEC
BH CV XLRA MEAS LEFT PROX ECA EDV: 6.1 CM/SEC
BH CV XLRA MEAS LEFT PROX ECA PSV: 79.4 CM/SEC
BH CV XLRA MEAS LEFT PROX ICA EDV: 18.7 CM/SEC
BH CV XLRA MEAS LEFT PROX ICA PSV: 88.2 CM/SEC
BH CV XLRA MEAS LEFT PROX SCLA PSV: 168 CM/SEC
BH CV XLRA MEAS LEFT VERTEBRAL A EDV: 9.4 CM/SEC
BH CV XLRA MEAS LEFT VERTEBRAL A PSV: 42.4 CM/SEC
BH CV XLRA MEAS RIGHT CCA RATIO VEL: 56.2 CM/SEC
BH CV XLRA MEAS RIGHT DIST CCA EDV: 9.8 CM/SEC
BH CV XLRA MEAS RIGHT DIST CCA PSV: 61.7 CM/SEC
BH CV XLRA MEAS RIGHT DIST ICA EDV: 23.6 CM/SEC
BH CV XLRA MEAS RIGHT DIST ICA PSV: 216.1 CM/SEC
BH CV XLRA MEAS RIGHT ICA RATIO VEL: 385 CM/SEC
BH CV XLRA MEAS RIGHT ICA/CCA RATIO: 6.9
BH CV XLRA MEAS RIGHT MID CCA EDV: 9.1 CM/SEC
BH CV XLRA MEAS RIGHT MID CCA PSV: 56.6 CM/SEC
BH CV XLRA MEAS RIGHT MID ICA EDV: 72.7 CM/SEC
BH CV XLRA MEAS RIGHT MID ICA PSV: 387 CM/SEC
BH CV XLRA MEAS RIGHT PROX CCA EDV: 6.6 CM/SEC
BH CV XLRA MEAS RIGHT PROX CCA PSV: 60.8 CM/SEC
BH CV XLRA MEAS RIGHT PROX ECA PSV: 104 CM/SEC
BH CV XLRA MEAS RIGHT PROX ICA EDV: 49.1 CM/SEC
BH CV XLRA MEAS RIGHT PROX ICA PSV: 196.4 CM/SEC
BH CV XLRA MEAS RIGHT PROX SCLA PSV: 111 CM/SEC
BH CV XLRA MEAS RIGHT VERTEBRAL A EDV: 8.7 CM/SEC
BH CV XLRA MEAS RIGHT VERTEBRAL A PSV: 55.9 CM/SEC
LEFT ARM BP: NORMAL MMHG
RIGHT ARM BP: NORMAL MMHG

## 2019-09-12 PROCEDURE — 93880 EXTRACRANIAL BILAT STUDY: CPT

## 2019-09-12 PROCEDURE — 99214 OFFICE O/P EST MOD 30 MIN: CPT | Performed by: PHYSICIAN ASSISTANT

## 2019-09-12 PROCEDURE — 93880 EXTRACRANIAL BILAT STUDY: CPT | Performed by: INTERNAL MEDICINE

## 2019-09-12 RX ORDER — HYDROCHLOROTHIAZIDE 25 MG/1
25 TABLET ORAL DAILY
COMMUNITY
End: 2020-07-16 | Stop reason: ALTCHOICE

## 2019-09-12 RX ORDER — AMLODIPINE BESYLATE 2.5 MG/1
2.5 TABLET ORAL DAILY
Refills: 1 | COMMUNITY
Start: 2019-07-16

## 2019-09-12 RX ORDER — SERTRALINE HYDROCHLORIDE 25 MG/1
25 TABLET, FILM COATED ORAL EVERY MORNING
Refills: 2 | COMMUNITY
Start: 2019-07-09 | End: 2019-09-12

## 2019-09-12 NOTE — PROGRESS NOTES
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  Patient was seen about 18 months ago by Dr. Troy Forrester and we requested he see us back in 6 or 8 months with a duplex.  Unfortunately patient did not come in as directed, but is now back with a repeat duplex.  Progression is noted on his right internal carotid artery.  Right proximal ICA is now 196 mid ICA is 387 and distal ICA is 216 when last checked the velocities were 69, 336, 48 respectively.    Patient has not had any signs or symptoms of stroke and feels \"normal.  Patient has not been taking his statin as prescribed due to some muscle fatigue.  Patient states that sometimes he takes it every other day but sometimes he misses the days in between.  Compliance is clearly an issue.    I discussed in detail with the risks of going untreated with a stenosis of this magnitude.  Maximum medical therapy would be considered aspirin Plavix and high-dose statin.  Unfortunately he was taking pravastatin and not taking it as directed.    Patient has been previously seen by [facility] and we have requested/suggested he be put into the crest trial, unfortunately patient does not want to be treated at that time.  I have convinced him to consider options for treatment of this.    We will discuss with [facility] and give the patient a call.    Review of Systems   Constitutional: Positive for appetite change and chills. Negative for activity change, diaphoresis, fatigue, fever and unexpected weight change.   HENT: Positive for congestion, hearing loss and trouble swallowing. Negative for dental problem, drooling, ear discharge, ear pain, facial swelling, mouth sores, nosebleeds, postnasal drip, rhinorrhea, sinus pressure, sneezing, sore throat, tinnitus and voice " change.    Eyes: Positive for itching. Negative for photophobia, pain, discharge, redness and visual disturbance.   Respiratory: Positive for shortness of breath. Negative for apnea, cough, choking, chest tightness, wheezing and stridor.    Cardiovascular: Negative for chest pain, palpitations and leg swelling.   Gastrointestinal: Positive for constipation. Negative for abdominal distention, abdominal pain, anal bleeding, blood in stool, diarrhea, nausea, rectal pain and vomiting.   Endocrine: Negative for cold intolerance, heat intolerance, polydipsia, polyphagia and polyuria.   Genitourinary: Negative for decreased urine volume, difficulty urinating, dysuria, enuresis, flank pain, frequency, genital sores, hematuria and urgency.   Musculoskeletal: Positive for arthralgias, myalgias and neck stiffness. Negative for back pain, gait problem, joint swelling and neck pain.   Skin: Negative for color change, pallor, rash and wound.   Allergic/Immunologic: Negative for environmental allergies, food allergies and immunocompromised state.   Neurological: Positive for dizziness and light-headedness. Negative for tremors, seizures, syncope, facial asymmetry, speech difficulty, weakness, numbness and headaches.   Hematological: Negative for adenopathy. Does not bruise/bleed easily.   Psychiatric/Behavioral: Negative for agitation, behavioral problems, confusion, decreased concentration, dysphoric mood, hallucinations, self-injury, sleep disturbance and suicidal ideas. The patient is nervous/anxious. The patient is not hyperactive.    All other systems reviewed and are negative.      Past Medical History:     Past Medical History:   Diagnosis Date   • Heart disease    • Hypertension    • Myocardial infarction (CMS/HCC) 2003       Family History:     Family History   Problem Relation Age of Onset   • Arthritis Mother    • Arthritis Father        Social History:    reports that he has never smoked. He does not have any smokeless  "tobacco history on file. He reports that he does not drink alcohol or use drugs.   SMOKING STATUS: Non-smoker    Surgical History:     Past Surgical History:   Procedure Laterality Date   • CARDIAC SURGERY  2003    Open heart surgery, x4 bypasses   • EYE SURGERY  10/22/09 & 09/23/14    x2   • HERNIA REPAIR  1962    x2   • PROSTATE SURGERY  1980       Allergies:   Penicillins    Physical Exam:    Vital Signs:BP (!) 186/78 (BP Location: Left arm, Patient Position: Sitting, Cuff Size: Adult)   Resp 16   Ht 170.2 cm (67.01\")   Wt 74.8 kg (164 lb 12.8 oz)   BMI 25.80 kg/m²    BMI: Body mass index is 25.8 kg/m².    GENERAL:         The patient is in no acute distress, and is able to answer all questions appropriately.  Neck:        Supple without lymphadenopathy       I could not hear any carotid bruits  Musculoskeletal:          strength is 5 out of 5 bilaterally.        Shoulder abduction is 5 out of 5.         Dorsiflexion is 5/5 Bilaterally       Plantarflexion is 5/5 bilaterally       Hip Flexion 5/5 bilaterally.         The patient´s gait is normal without antalgia.  Neurologic:        The patient is alert and oriented by 3.          Pupils are equal and reactive to light.         Visual fields are full.         Extraocular movements are intact without nystagmus.         There is no evidence of central motor drift. No facial droop.  No difficulty with rapid alternating movements.         Sensation is equal bilaterally with no deficit.           Reflexes:  2+ @ biceps, triceps, brachioradialis, as well as the patellar and Achilles tendon bilaterally.        Medical Decision Making    Data Review:   Duplex ultrasound reviewed and compared as described in HPI    Diagnosis:   Right internal carotid artery stenosis   High-grade currently untreated    Treatment Options:   I discussed treatment options with the patient.  Stent versus endarterectomy versus maximum medical therapy.  Unfortunately patient is unable to " take high-dose statins and this would put him at a much higher risk for stroke and progression of disease.  Patient is now willing to consider stenting/CEA.  I discussed with him that this recommendation would have to come from Dr. Forrester.  We will review his duplex and call the patient and discuss ongoing treatment.      No diagnosis found.

## 2019-09-17 ENCOUNTER — PREP FOR SURGERY (OUTPATIENT)
Dept: OTHER | Facility: HOSPITAL | Age: 84
End: 2019-09-17

## 2019-09-19 ENCOUNTER — DOCUMENTATION (OUTPATIENT)
Dept: NEUROSURGERY | Facility: CLINIC | Age: 84
End: 2019-09-19

## 2019-09-19 ENCOUNTER — TELEPHONE (OUTPATIENT)
Dept: NEUROSURGERY | Facility: CLINIC | Age: 84
End: 2019-09-19

## 2019-09-19 DIAGNOSIS — I65.21 STENOSIS OF RIGHT CAROTID ARTERY: Primary | ICD-10-CM

## 2019-09-19 NOTE — PROGRESS NOTES
I was asked by my PA to review ultrasounds recently on a patient well-known to me for history of asymptomatic right-sided carotid stenosis.    He is currently on aspirin Plavix and statin therapy he has no symptoms I had called him on the phone to double check.    I reviewed a carotid ultrasound the velocities are obviously concerning they are mildly elevated compared to studies in 2017 however his end-diastolic velocities are still less then 100.    He has dense calcification he has a short proximal common carotid artery segment he is not a candidate for T CAR, additionally carotid stenting while could be performed would be higher risk secondary to proximal arch tortuosity and dense calcification of the lesion.    Given this asymptomatic nature I recommend ongoing medical management we will see him back in 8 months to a year with a repeat ultrasound if the end diastolics go over 100 or obviously if he has any sort of TIA like symptoms we would consider intervention.    I would recommend that he stay on aspirin and Plavix not remove his dual antiplatelet therapy and most importantly avoid dehydration states.

## 2019-09-20 NOTE — TELEPHONE ENCOUNTER
Patients son has some questions before this appointment is canceled. I was told per Shaquille to make this appointment to schedule a Surgery. I transferred the son the the clinical line.

## 2019-09-20 NOTE — TELEPHONE ENCOUNTER
"Son left msg on clinical line but the number he called from is now \"no longer in service\".  I have attempted this 2x.       "

## 2019-09-20 NOTE — TELEPHONE ENCOUNTER
I spoke with pt's son and explained that Dr. Forrester did not want to do surgery, he recommends medical management only. Pt will f/u in 8 months to 1 year with a carotid duplex.     Please schedule.

## 2020-05-14 ENCOUNTER — APPOINTMENT (OUTPATIENT)
Dept: CARDIOLOGY | Facility: HOSPITAL | Age: 85
End: 2020-05-14

## 2020-05-21 ENCOUNTER — APPOINTMENT (OUTPATIENT)
Dept: CARDIOLOGY | Facility: HOSPITAL | Age: 85
End: 2020-05-21

## 2020-07-15 ENCOUNTER — HOSPITAL ENCOUNTER (OUTPATIENT)
Dept: CARDIOLOGY | Facility: HOSPITAL | Age: 85
Discharge: HOME | End: 2020-07-15
Admitting: NEUROLOGICAL SURGERY

## 2020-07-15 VITALS — BODY MASS INDEX: 25.74 KG/M2 | WEIGHT: 164 LBS | HEIGHT: 67 IN

## 2020-07-15 DIAGNOSIS — I65.21 STENOSIS OF RIGHT CAROTID ARTERY: ICD-10-CM

## 2020-07-15 LAB
BH CV ECHO MEAS - BSA(HAYCOCK): 1.9 M^2
BH CV ECHO MEAS - BSA: 1.9 M^2
BH CV ECHO MEAS - BZI_BMI: 25.7 KILOGRAMS/M^2
BH CV ECHO MEAS - BZI_METRIC_HEIGHT: 170.2 CM
BH CV ECHO MEAS - BZI_METRIC_WEIGHT: 74.4 KG
BH CV XLRA MEAS LEFT CCA RATIO VEL: 79.6 CM/SEC
BH CV XLRA MEAS LEFT DIST CCA EDV: 18.7 CM/SEC
BH CV XLRA MEAS LEFT DIST CCA PSV: 80.5 CM/SEC
BH CV XLRA MEAS LEFT DIST ICA EDV: 25.1 CM/SEC
BH CV XLRA MEAS LEFT DIST ICA PSV: 91.1 CM/SEC
BH CV XLRA MEAS LEFT ICA RATIO VEL: 109 CM/SEC
BH CV XLRA MEAS LEFT ICA/CCA RATIO: 1.4
BH CV XLRA MEAS LEFT MID CCA EDV: 19.6 CM/SEC
BH CV XLRA MEAS LEFT MID CCA PSV: 86.4 CM/SEC
BH CV XLRA MEAS LEFT MID ICA EDV: 23.9 CM/SEC
BH CV XLRA MEAS LEFT MID ICA PSV: 98.1 CM/SEC
BH CV XLRA MEAS LEFT PROX CCA EDV: 16.7 CM/SEC
BH CV XLRA MEAS LEFT PROX CCA PSV: 142.4 CM/SEC
BH CV XLRA MEAS LEFT PROX ECA PSV: 107.5 CM/SEC
BH CV XLRA MEAS LEFT PROX ICA EDV: 23.6 CM/SEC
BH CV XLRA MEAS LEFT PROX ICA PSV: 110 CM/SEC
BH CV XLRA MEAS LEFT PROX SCLA PSV: 137.5 CM/SEC
BH CV XLRA MEAS LEFT VERTEBRAL A EDV: 16.3 CM/SEC
BH CV XLRA MEAS LEFT VERTEBRAL A PSV: 62.9 CM/SEC
BH CV XLRA MEAS RIGHT CCA RATIO VEL: 73.3 CM/SEC
BH CV XLRA MEAS RIGHT DIST CCA EDV: 11.6 CM/SEC
BH CV XLRA MEAS RIGHT DIST CCA PSV: 73.9 CM/SEC
BH CV XLRA MEAS RIGHT DIST ICA EDV: 16.1 CM/SEC
BH CV XLRA MEAS RIGHT DIST ICA PSV: 73.9 CM/SEC
BH CV XLRA MEAS RIGHT ICA RATIO VEL: 432 CM/SEC
BH CV XLRA MEAS RIGHT ICA/CCA RATIO: 5.9
BH CV XLRA MEAS RIGHT MID CCA EDV: 10 CM/SEC
BH CV XLRA MEAS RIGHT MID CCA PSV: 58.5 CM/SEC
BH CV XLRA MEAS RIGHT MID ICA EDV: 116 CM/SEC
BH CV XLRA MEAS RIGHT MID ICA PSV: 418 CM/SEC
BH CV XLRA MEAS RIGHT PROX CCA EDV: 10 CM/SEC
BH CV XLRA MEAS RIGHT PROX CCA PSV: 48 CM/SEC
BH CV XLRA MEAS RIGHT PROX ECA PSV: 103.4 CM/SEC
BH CV XLRA MEAS RIGHT PROX ICA EDV: 16.5 CM/SEC
BH CV XLRA MEAS RIGHT PROX ICA PSV: 74.4 CM/SEC
BH CV XLRA MEAS RIGHT PROX SCLA PSV: 117.9 CM/SEC
BH CV XLRA MEAS RIGHT VERTEBRAL A EDV: 12.6 CM/SEC
BH CV XLRA MEAS RIGHT VERTEBRAL A PSV: 68.4 CM/SEC
LEFT ARM BP: NORMAL MMHG
RIGHT ARM BP: NORMAL MMHG

## 2020-07-15 PROCEDURE — 93880 EXTRACRANIAL BILAT STUDY: CPT | Performed by: INTERNAL MEDICINE

## 2020-07-15 PROCEDURE — 93880 EXTRACRANIAL BILAT STUDY: CPT

## 2020-07-16 ENCOUNTER — OFFICE VISIT (OUTPATIENT)
Dept: NEUROSURGERY | Facility: CLINIC | Age: 85
End: 2020-07-16

## 2020-07-16 VITALS
SYSTOLIC BLOOD PRESSURE: 140 MMHG | TEMPERATURE: 97.7 F | BODY MASS INDEX: 26.43 KG/M2 | WEIGHT: 168.4 LBS | HEIGHT: 67 IN | DIASTOLIC BLOOD PRESSURE: 65 MMHG | RESPIRATION RATE: 14 BRPM

## 2020-07-16 DIAGNOSIS — I65.21 ASYMPTOMATIC STENOSIS OF RIGHT CAROTID ARTERY: Primary | ICD-10-CM

## 2020-07-16 PROCEDURE — 99214 OFFICE O/P EST MOD 30 MIN: CPT | Performed by: NEUROLOGICAL SURGERY

## 2020-07-16 NOTE — PROGRESS NOTES
NAME: HERMAN ZAVALA   DOS: 2020  : 1933  PCP: Jean Paul Waldron MD    Chief Complaint:    Chief Complaint   Patient presents with   • carotid stenosis       History of Present Illness:  87 y.o. 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 and calcification he also has a long segment calcific disease he was currently asymptomatic this was found on ultrasound    He is currently on aspirin and Plavix as well as statin medicines in the last year he is developed some intermittent vertigo is relatively classic meclizine relieves it and has had it for years is just gotten worse he denies any flagrant symptoms of TIA or stroke such as double vision amaurosis he does occasionally visual issues but it is difficult to ascertain whether or not these represent any sort of amaurosis spells they are mostly related to his vertigo    He is here for evaluation    PMHX  Allergies:  Allergies   Allergen Reactions   • Penicillins      Medications    Current Outpatient Medications:   •  allopurinol (ZYLOPRIM) 100 MG tablet, Take 100 mg by mouth 2 (Two) Times a Day., Disp: , Rfl: 0  •  amLODIPine (NORVASC) 2.5 MG tablet, Take 2.5 mg by mouth Daily., Disp: , Rfl: 1  •  aspirin (ECOTRIN LOW STRENGTH) 81 MG EC tablet, Take 81 mg by mouth Daily., Disp: , Rfl:   •  carvedilol (COREG) 6.25 MG tablet, Take 6.25 mg by mouth 2 (Two) Times a Day With Meals., Disp: , Rfl:   •  clopidogrel (PLAVIX) 75 MG tablet, Take 75 mg by mouth Daily., Disp: , Rfl:   •  hydrochlorothiazide (HYDRODIURIL) 25 MG tablet, Take 25 mg by mouth Daily., Disp: , Rfl:   •  hydrocortisone 2.5 % cream, APPLY AS DIRECTED UP TO 3 TIMES A DAY, Disp: , Rfl: 0  •  hydrOXYzine (ATARAX) 10 MG tablet, Take 25 mg by mouth 3 (Three) Times  a Day As Needed for Itching., Disp: , Rfl:   •  linaclotide (LINZESS) 145 MCG capsule capsule, Take 145 mcg by mouth Every Morning Before Breakfast., Disp: , Rfl:   •  meclizine (ANTIVERT) 25 MG tablet, take 1 tablet by mouth every 6 hours if needed for VERTIGO, Disp: , Rfl: 0  •  nitroglycerin (NITROLINGUAL) 0.4 MG/SPRAY spray, Place 1 spray under the tongue As Needed for Chest Pain., Disp: , Rfl:   •  nitroglycerin (NITROSTAT) 0.4 MG SL tablet, PLACE 1 TABLET UNDER THE TONGUE AND ALLOW TO DISSOLVE 1 TIME, Disp: , Rfl: 0  •  polyethylene glycol (MIRALAX) packet, Take 17 g by mouth Daily., Disp: , Rfl:   •  potassium chloride (MICRO-K) 10 MEQ CR capsule, Take 10 mEq by mouth Daily., Disp: , Rfl: 0  •  pravastatin (PRAVACHOL) 20 MG tablet, Take 20 mg by mouth Daily., Disp: , Rfl:   •  RA SALINE NASAL SPRAY 0.65 % nasal spray, USE 2 SPRAYS IN EACH NOSTRIL EVERY 4 HOURS IF NEEDED, Disp: , Rfl: 0  •  tamsulosin (FLOMAX) 0.4 MG capsule 24 hr capsule, Take 1 capsule by mouth Every Night., Disp: , Rfl:   Past Medical History:  Past Medical History:   Diagnosis Date   • Heart disease    • Hypertension    • Myocardial infarction (CMS/HCC) 2003     Past Surgical History:  Past Surgical History:   Procedure Laterality Date   • CARDIAC SURGERY  2003    Open heart surgery, x4 bypasses   • EYE SURGERY  10/22/09 & 09/23/14    x2   • HERNIA REPAIR  1962    x2   • PROSTATE SURGERY  1980     Social Hx:  Social History     Tobacco Use   • Smoking status: Never Smoker   Substance Use Topics   • Alcohol use: No   • Drug use: No     Family Hx:  Family History   Problem Relation Age of Onset   • Arthritis Mother    • Arthritis Father      Review of Systems:        Review of Systems   Constitutional: Negative for activity change, appetite change, chills, diaphoresis, fatigue, fever and unexpected weight change.   HENT: Negative for congestion, dental problem, drooling, ear discharge, ear pain, facial swelling, hearing loss, mouth sores,  nosebleeds, postnasal drip, rhinorrhea, sinus pressure, sneezing, sore throat, tinnitus, trouble swallowing and voice change.    Eyes: Negative for photophobia, pain, discharge, redness, itching and visual disturbance.   Respiratory: Negative for apnea, cough, choking, chest tightness, shortness of breath, wheezing and stridor.    Cardiovascular: Negative for chest pain, palpitations and leg swelling.   Gastrointestinal: Negative for abdominal distention, abdominal pain, anal bleeding, blood in stool, constipation, diarrhea, nausea, rectal pain and vomiting.   Endocrine: Negative for cold intolerance, heat intolerance, polydipsia, polyphagia and polyuria.   Genitourinary: Negative for decreased urine volume, difficulty urinating, dysuria, enuresis, flank pain, frequency, genital sores, hematuria and urgency.   Musculoskeletal: Negative for arthralgias, back pain, gait problem, joint swelling, myalgias, neck pain and neck stiffness.   Skin: Negative for color change, pallor, rash and wound.   Allergic/Immunologic: Negative for environmental allergies, food allergies and immunocompromised state.   Neurological: Negative for dizziness, tremors, seizures, syncope, facial asymmetry, speech difficulty, weakness, light-headedness, numbness and headaches.   Hematological: Negative for adenopathy. Does not bruise/bleed easily.   Psychiatric/Behavioral: Negative for agitation, behavioral problems, confusion, decreased concentration, dysphoric mood, hallucinations, self-injury, sleep disturbance and suicidal ideas. The patient is not nervous/anxious and is not hyperactive.    All other systems reviewed and are negative.  I have reviewed this note template and all pertinent parts of the review of systems social, family history, surgical history and medication list        Physical Examination:  Vitals:    07/16/20 0913   BP: 140/65   Resp: 14   Temp: 97.7 °F (36.5 °C)      General Appearance:   Well developed, well nourished,  well groomed, alert, and cooperative.  Neurological examination:  Neurologic Exam  Vital signs were reviewed and documented in the chart  Patient appeared in good neurologic function with normal comprehension fluent speech  Mood and affect are normal  Sense of smell deferred    Pupils symmetric equally reactive funduscopic exam not visualized   Visual fields intact to confrontation  Extraocular movements intact  Face motor function is symmetric  Facial sensations normal  Hearing intact to finger  he is hard of hearing however  Tongue is midline  Palate symmetric  Swallowing normal  Shoulder shrug normal  Reasonable range of motion of his neck  Muscle bulk and tone normal  5 out of 5 strength no motor drift  Gait normal intact-mildly wide-based  Negative Romberg almost positive but not  No clonus long tract signs or myelopathy    Reflexes symmetric        Review of Imaging/DATA:  I reviewed a carotid ultrasound his carotid velocities on the right are 418/160 with ratios of 5.9 this compares 336/48 back in 2017 CTA was reviewed but nothing recently  Diagnoses/Plan:    Mr. Gallegos is a 87 y.o. male   He definitely has persistent high-grade progressive stenosis of the right internal carotid he has vertigo but there is no evidence of any sort of sidedness to suggest strokelike symptoms but I explained that I cannot exclude that he has renal insufficiency advanced age and a history of prior cardiac surgery-he strictly does not want to pursue any surgical intervention but I did encourage him to go talk to her cardiologist to at least risk stratify him he would require a right endarterectomy he does not have enough on the landing zone to do a T CAR and he has a type III aortic arch which I think poses increased risk for carotid artery stenting-in terms of wrist ratification the risk of endarterectomy from my standpoint with standard cardiac risk would be approximately 5 to 6% given his advanced age, I the risk of his  "asymptomatic carotid going onto occlusion and not causing a fatal or disabling stroke is probably somewhere between 5 and 20% but I explained is probably in the middle somewhere as long as he maintains hydrational status-it also is important for asymptomatic patients that have life expectancy less than 5 years the carotid asymptomatic revascularization is not associated with statistical benefit-additionally his function is pretty good right now however he is unsteady on his feet it does appear somewhat \"brittle \"overall    As far as blood pressure she should be allowed to have permissive hypertension we DC'd his diuretic to see if this would help with his vertigo as well as his Flomax to be held and told him to follow-up with his primary care doctor    He does not want to pursue any therapy he is going to call me if he wishes to proceed with right-sided endarterectomy and surgical intervention-I explained the risk benefits and expected outcome importance of continuing dual antiplatelet therapy as well as return to my office or emergency room if he develops any clear-cut amaurosis or weakness    "

## 2020-07-16 NOTE — PATIENT INSTRUCTIONS
Please call Dr. Forrester after you see the Cardiologist in [place]. Let us know if you want to proceed with surgery.   [phone]  -Stop taking Hydrochlorothiazide & Flomax